# Patient Record
Sex: FEMALE | URBAN - METROPOLITAN AREA
[De-identification: names, ages, dates, MRNs, and addresses within clinical notes are randomized per-mention and may not be internally consistent; named-entity substitution may affect disease eponyms.]

---

## 2021-11-05 ENCOUNTER — OFFICE VISIT (OUTPATIENT)
Dept: URBAN - METROPOLITAN AREA CLINIC 22 | Facility: CLINIC | Age: 41
End: 2021-11-05
Payer: COMMERCIAL

## 2021-11-05 DIAGNOSIS — H40.053 OCULAR HYPERTENSION, BILATERAL: Primary | ICD-10-CM

## 2021-11-05 PROCEDURE — 99204 OFFICE O/P NEW MOD 45 MIN: CPT | Performed by: STUDENT IN AN ORGANIZED HEALTH CARE EDUCATION/TRAINING PROGRAM

## 2021-11-05 PROCEDURE — 92133 CPTRZD OPH DX IMG PST SGM ON: CPT | Performed by: STUDENT IN AN ORGANIZED HEALTH CARE EDUCATION/TRAINING PROGRAM

## 2021-11-05 ASSESSMENT — INTRAOCULAR PRESSURE
OD: 18
OS: 18
OD: 21
OS: 21

## 2021-11-05 NOTE — IMPRESSION/PLAN
Impression: Ocular hypertension, bilateral: H40.053.
-- IOP today 18/18
-- OCT 11/05/21: OD 96; WNL
             ; WNL Plan: IOP WNL OU today. Patient has been on acetazolamide 250mg BID and glaucoma drop QID (unknown, but patient states orange cap so likely dorz/brinzolamide). OCT ordered and reviewed, shows WNL thicknesses. (partial scan OD, rec repeating at follow-up). Angles appeared open post-dilation. Reviewed symptoms of angle closure and instructed patient to contact clinic immediately if she is experiencing them. Complete course of acetazolamide and continue glaucoma gtt until next visit. RTC 1 week for HVF, OCT, IOP check, angle check.

## 2024-12-04 ENCOUNTER — OFFICE VISIT (OUTPATIENT)
Facility: LOCATION | Age: 44
End: 2024-12-04
Payer: COMMERCIAL

## 2024-12-04 DIAGNOSIS — H52.4 PRESBYOPIA: ICD-10-CM

## 2024-12-04 DIAGNOSIS — E11.9 TYPE 2 DIABETES MELLITUS W/O COMPLICATION: Primary | ICD-10-CM

## 2024-12-04 DIAGNOSIS — H40.013 OPEN ANGLE WITH BORDERLINE FINDINGS, LOW RISK, BILATERAL: ICD-10-CM

## 2024-12-04 PROCEDURE — 99204 OFFICE O/P NEW MOD 45 MIN: CPT | Performed by: OPHTHALMOLOGY

## 2024-12-04 ASSESSMENT — INTRAOCULAR PRESSURE
OS: 15
OD: 16